# Patient Record
Sex: FEMALE | Race: WHITE | URBAN - METROPOLITAN AREA
[De-identification: names, ages, dates, MRNs, and addresses within clinical notes are randomized per-mention and may not be internally consistent; named-entity substitution may affect disease eponyms.]

---

## 2018-08-21 ENCOUNTER — OFFICE VISIT (OUTPATIENT)
Dept: OBGYN CLINIC | Facility: CLINIC | Age: 28
End: 2018-08-21
Payer: COMMERCIAL

## 2018-08-21 VITALS — DIASTOLIC BLOOD PRESSURE: 77 MMHG | HEART RATE: 85 BPM | SYSTOLIC BLOOD PRESSURE: 113 MMHG | WEIGHT: 123 LBS

## 2018-08-21 DIAGNOSIS — Z01.419 WELL WOMAN EXAM: Primary | ICD-10-CM

## 2018-08-21 DIAGNOSIS — Z01.419 ENCOUNTER FOR GYNECOLOGICAL EXAMINATION WITHOUT ABNORMAL FINDING: ICD-10-CM

## 2018-08-21 PROCEDURE — 99385 PREV VISIT NEW AGE 18-39: CPT | Performed by: OBSTETRICS & GYNECOLOGY

## 2018-08-21 RX ORDER — NORGESTIMATE AND ETHINYL ESTRADIOL 0.25-0.035
1 KIT ORAL DAILY
Qty: 3 PACKAGE | Refills: 3
Start: 2018-08-21

## 2018-08-21 RX ORDER — NORGESTIMATE AND ETHINYL ESTRADIOL 0.25-0.035
1 KIT ORAL DAILY
Qty: 3 PACKAGE | Refills: 3 | Status: SHIPPED | OUTPATIENT
Start: 2018-08-21 | End: 2019-01-22

## 2018-08-21 NOTE — PROGRESS NOTES
Mitzy Reed is a 33year old female [facility] Patient's last menstrual period was 08/03/2018. Patient presents with:  Gyn Exam: New patient annual   She has no complaints. Pt moved from [place] and is working here in [place].   Her partner has a vasect denies depression or anxiety. Endocrine:   denies excessive thirst or urination. Heme/Lymph:  denies history of anemia, easy bruising or bleeding.       PHYSICAL EXAM:   Constitutional: well developed, well nourished  Head/Face: normocephalic  Neck/Thyroi

## 2018-08-22 LAB
C TRACH DNA SPEC QL NAA+PROBE: NEGATIVE
N GONORRHOEA DNA SPEC QL NAA+PROBE: NEGATIVE
T VAGINALIS RRNA SPEC QL NAA+PROBE: NEGATIVE

## 2018-08-23 ENCOUNTER — TELEPHONE (OUTPATIENT)
Dept: OBGYN CLINIC | Facility: CLINIC | Age: 28
End: 2018-08-23

## 2018-08-23 NOTE — TELEPHONE ENCOUNTER
----- Message from Shadi Churchill MD sent at 8/22/2018 11:56 AM CDT -----  Negative ,gc/chlamydia,call pt

## 2018-10-29 RX ORDER — NORGESTIMATE AND ETHINYL ESTRADIOL 0.25-0.035
1 KIT ORAL DAILY
Qty: 3 PACKAGE | Refills: 3 | OUTPATIENT
Start: 2018-10-29

## 2019-01-23 RX ORDER — NORGESTIMATE AND ETHINYL ESTRADIOL 0.25-0.035
1 KIT ORAL DAILY
Qty: 3 PACKAGE | Refills: 1 | Status: SHIPPED | OUTPATIENT
Start: 2019-01-23 | End: 2019-07-04

## 2019-01-23 RX ORDER — NORGESTIMATE AND ETHINYL ESTRADIOL 0.25-0.035
1 KIT ORAL DAILY
Qty: 3 PACKAGE | Refills: 3 | OUTPATIENT
Start: 2019-01-23

## 2019-01-23 NOTE — TELEPHONE ENCOUNTER
ANNUAL 8-21-18 AND WAS GIVEN 3 PACKS WITH 3 REFILLS. REQUEST FOR RX TO BE SENT TO NEW PHARMACY IN [facility]. RX AMENDED AND SENT FOR 3 PACKS WITH 1 REFILL TO NEW PHARMACY.

## 2019-07-05 RX ORDER — NORGESTIMATE AND ETHINYL ESTRADIOL 0.25-0.035
1 KIT ORAL DAILY
Qty: 1 PACKAGE | Refills: 0 | Status: SHIPPED | OUTPATIENT
Start: 2019-07-05 | End: 2019-07-29

## 2019-07-05 NOTE — TELEPHONE ENCOUNTER
Pt's last annual was 8/21/18 with normal pap. Pt was given  3 packs with 1 refill on 1/22/19. Pt does not have annual exam scheduled yet. One month sent to pharmacy to cover until annual is due in 8/2019.

## 2019-07-29 RX ORDER — NORGESTIMATE AND ETHINYL ESTRADIOL 0.25-0.035
1 KIT ORAL DAILY
Qty: 1 PACKAGE | Refills: 1 | Status: SHIPPED | OUTPATIENT
Start: 2019-07-29

## 2019-07-29 RX ORDER — NORGESTIMATE AND ETHINYL ESTRADIOL 0.25-0.035
1 KIT ORAL DAILY
Qty: 1 PACKAGE | Refills: 0 | OUTPATIENT
Start: 2019-07-29

## 2019-07-29 NOTE — TELEPHONE ENCOUNTER
LAST ANNUAL 8-21-18 (PAP NORMAL) AND NO FUTURE APPT MADE. SENT BACK RX DENIED, NEEDS APPT. SENT MESSAGE WITH THIS INFO TO PT VIA MY CHART.

## (undated) NOTE — LETTER
9/4/2018              Aaron Boateng        [facility] [id number]         Dear Lane Ibarra,      It was a pleasure to see you at our [address] [place] [address] office.  Your pap smear was normal, retu